# Patient Record
Sex: MALE | Race: WHITE | NOT HISPANIC OR LATINO | Employment: FULL TIME | URBAN - METROPOLITAN AREA
[De-identification: names, ages, dates, MRNs, and addresses within clinical notes are randomized per-mention and may not be internally consistent; named-entity substitution may affect disease eponyms.]

---
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
## 2020-01-26 ENCOUNTER — HOSPITAL ENCOUNTER (EMERGENCY)
Facility: HOSPITAL | Age: 20
Discharge: HOME | End: 2020-01-26
Attending: EMERGENCY MEDICINE | Admitting: EMERGENCY MEDICINE
Payer: COMMERCIAL

## 2020-01-26 ENCOUNTER — APPOINTMENT (EMERGENCY)
Dept: RADIOLOGY | Facility: HOSPITAL | Age: 20
End: 2020-01-26
Payer: COMMERCIAL

## 2020-01-26 VITALS
OXYGEN SATURATION: 97 % | BODY MASS INDEX: 30.15 KG/M2 | DIASTOLIC BLOOD PRESSURE: 79 MMHG | SYSTOLIC BLOOD PRESSURE: 148 MMHG | RESPIRATION RATE: 18 BRPM | TEMPERATURE: 98.2 F | HEART RATE: 90 BPM | WEIGHT: 234.79 LBS

## 2020-01-26 DIAGNOSIS — S42.391A OTHER FRACTURE OF SHAFT OF RIGHT HUMERUS, INITIAL ENCOUNTER FOR CLOSED FRACTURE: Primary | ICD-10-CM

## 2020-01-26 PROCEDURE — 73070 X-RAY EXAM OF ELBOW: CPT

## 2020-01-26 PROCEDURE — 99284 EMERGENCY DEPT VISIT MOD MDM: CPT | Performed by: NURSE PRACTITIONER

## 2020-01-26 PROCEDURE — 99283 EMERGENCY DEPT VISIT LOW MDM: CPT

## 2020-01-26 RX ORDER — HYDROCODONE BITARTRATE AND ACETAMINOPHEN 5; 325 MG/1; MG/1
1 TABLET ORAL EVERY 6 HOURS PRN
Qty: 20 TABLET | Refills: 0 | Status: SHIPPED | OUTPATIENT
Start: 2020-01-26

## 2020-01-26 RX ORDER — HYDROCODONE BITARTRATE AND ACETAMINOPHEN 5; 325 MG/1; MG/1
1 TABLET ORAL ONCE
Status: COMPLETED | OUTPATIENT
Start: 2020-01-26 | End: 2020-01-26

## 2020-01-26 RX ADMIN — HYDROCODONE BITARTRATE AND ACETAMINOPHEN 1 TABLET: 5; 325 TABLET ORAL at 20:17

## 2020-01-27 NOTE — ED PROVIDER NOTES
History  Chief Complaint   Patient presents with    Elbow Injury     fell while skiing, landed on elbow, c/o right elbow pain        Arm Injury - Major   Location:  Arm  Arm location:  R upper arm  Injury: yes    Time since incident:  1 hour  Mechanism of injury: fall    Fall:     Fall occurred:  Skiing/snowboarding    Impact surface:  [facility] of impact:  Outstretched arms  Pain details:     Quality:  Throbbing    Radiates to:  Does not radiate    Severity:  Moderate    Onset quality:  Sudden    Timing:  Constant  Handedness:  Right-handed  Dislocation: no    Prior injury to area:  No  Relieved by:  None tried  Worsened by: Movement  Ineffective treatments:  None tried  Associated symptoms: no back pain, no fatigue and no fever        Prior to Admission Medications   Prescriptions Last Dose Informant Patient Reported? Taking?   ibuprofen (MOTRIN) 600 mg tablet   No No   Sig: Take 1 tablet (600 mg total) by mouth every 6 (six) hours as needed for mild pain for up to 10 days   meclizine (ANTIVERT) 25 mg tablet   No No   Sig: Take 1 tablet by mouth 3 (three) times a day as needed for dizziness for up to 3 days      Facility-Administered Medications: None       Past Medical History:   Diagnosis Date    Asthma     last assessed 1/28/15    Bell's palsy     last assessed 8/18/17        History reviewed  No pertinent surgical history  Family History   Problem Relation Age of Onset    Bipolar disorder Father     Lactose intolerance Maternal Grandmother     Heart failure Family     Irritable bowel syndrome Family     Lactose intolerance Family     Food intolerance Family         gluten intolerance     Thyroid disease Family      I have reviewed and agree with the history as documented      Social History     Tobacco Use    Smoking status: Never Smoker    Smokeless tobacco: Never Used    Tobacco comment: never smoker per allscript    Substance Use Topics    Alcohol use: Never     Frequency: Never    Drug use: No        Review of Systems   Constitutional: Negative for diaphoresis, fatigue and fever  HENT: Negative for congestion, ear pain, nosebleeds and sore throat  Eyes: Negative for photophobia, pain, discharge and visual disturbance  Respiratory: Negative for cough, choking, chest tightness, shortness of breath and wheezing  Cardiovascular: Negative for chest pain and palpitations  Gastrointestinal: Negative for abdominal distention, abdominal pain, diarrhea and vomiting  Genitourinary: Negative for dysuria, flank pain and frequency  Musculoskeletal: Negative for back pain, gait problem and joint swelling  Skin: Negative for color change and rash  Neurological: Negative for dizziness, syncope and headaches  Psychiatric/Behavioral: Negative for behavioral problems and confusion  The patient is not nervous/anxious  All other systems reviewed and are negative  Physical Exam  Physical Exam   Constitutional: He is oriented to person, place, and time  He appears well-developed and well-nourished  No distress  HENT:   Head: Normocephalic and atraumatic  Eyes: Conjunctivae and EOM are normal  Right eye exhibits no discharge  Left eye exhibits no discharge  Neck: Normal range of motion  Neck supple  Cardiovascular: Normal rate  Pulmonary/Chest: Effort normal  No respiratory distress  Abdominal: He exhibits no distension  There is no guarding  Musculoskeletal: He exhibits no edema or deformity  Right elbow: He exhibits swelling and effusion  Right upper arm: He exhibits tenderness and swelling  Neurological: He is alert and oriented to person, place, and time  Coordination normal    Skin: Skin is warm and dry  Psychiatric: He has a normal mood and affect  Nursing note and vitals reviewed        Vital Signs  ED Triage Vitals [01/26/20 1938]   Temperature Pulse Respirations Blood Pressure SpO2   98 2 °F (36 8 °C) 90 18 148/79 97 %      Temp Source Heart Rate Source Patient Position - Orthostatic VS BP Location FiO2 (%)   Oral Monitor Sitting Left arm --      Pain Score       7           Vitals:    01/26/20 1938   BP: 148/79   Pulse: 90   Patient Position - Orthostatic VS: Sitting         Visual Acuity      ED Medications  Medications   HYDROcodone-acetaminophen (NORCO) 5-325 mg per tablet 1 tablet (1 tablet Oral Given 1/26/20 2017)       Diagnostic Studies  Results Reviewed     None                 XR elbow 2 vw right   ED Interpretation by TRISH Pal (01/26 2101)   Humerus fracture       by Matilda Toney (01/26 2031)                 Procedures  Splint application  Date/Time: 1/26/2020 9:08 PM  Performed by: TRISH Pal  Authorized by: TRISH Pal     Patient location:  ED  Performing Provider:  NP  Other Assisting Provider: No    Consent:     Consent obtained:  Verbal and emergent situation    Consent given by:  Patient and parent    Risks discussed:  Discoloration, numbness, pain and swelling    Alternatives discussed:  No treatment and delayed treatment  Universal protocol:     Procedure explained and questions answered to patient or proxy's satisfaction: yes      Site/side marked: yes      Patient identity confirmed:  Verbally with patient and arm band  Indication:     Indications: fracture    Pre-procedure details:     Sensation:  Normal  Procedure details:     Splint type: Coaptation splint  Supplies:  Cotton padding, elastic bandage and Ortho-Glass  Post-procedure details:     Pain:  Improved    Sensation:  Normal    Neurovascular Exam: skin pink, capillary refill <2 sec, normal pulses and skin intact, warm, and dry      Patient tolerance of procedure:   Tolerated well, no immediate complications             ED Course                               MDM  Number of Diagnoses or Management Options  Other fracture of shaft of right humerus, initial encounter for closed fracture: new and requires workup     Amount and/or Complexity of Data Reviewed  Tests in the radiology section of CPT®: reviewed and ordered  Independent visualization of images, tracings, or specimens: yes    Patient Progress  Patient progress: improved        Disposition  Final diagnoses:   Other fracture of shaft of right humerus, initial encounter for closed fracture     Time reflects when diagnosis was documented in both MDM as applicable and the Disposition within this note     Time User Action Codes Description Comment    1/26/2020  9:05 PM John Kern Other fracture of shaft of right humerus, initial encounter for closed fracture       ED Disposition     ED Disposition Condition Date/Time Comment    Discharge Stable Sun Jan 26, 2020  9:03 PM Lee Lisa discharge to home/self care  Follow-up Information    None         Patient's Medications   Discharge Prescriptions    HYDROCODONE-ACETAMINOPHEN (NORCO) 5-325 MG PER TABLET    Take 1 tablet by mouth every 6 (six) hours as needed (Not relieved by Anti-inflammatory) for up to 20 dosesMax Daily Amount: 4 tablets       Start Date: 1/26/2020 End Date: --       Order Dose: 1 tablet       Quantity: 20 tablet    Refills: 0     No discharge procedures on file      ED Provider  Electronically Signed by           TRISH Cedeno  01/26/20 2024

## 2021-11-10 NOTE — PROGRESS NOTES
Assessment/Plan:       Diagnoses and all orders for this visit:    Exudative pharyngitis        1  Stay hydrated while feeling ill  2  Use NSAID for symptom relief  3  Follow up if condition changes or worsens  Subjective:      Patient ID: Jr Peoples is a 24 y o  male  53-year-old male presents with viral like symptoms since yesterday  Had a sore throat with a little bit of itchiness  Dull achy last night  Reports when he swallows of feels like swallowing glass  Feel sick  Took some over-the-counter  No help  The following portions of the patient's history were reviewed and updated as appropriate: allergies and current medications  Review of Systems   Constitutional: Positive for activity change  HENT: Positive for sore throat  Respiratory: Negative  Cardiovascular: Negative  Objective:  /68 (BP Location: Right arm, Patient Position: Sitting)   Pulse 99   Temp 99 °F (37 2 °C)   Resp 18   Ht 6' 2" (1 88 m)   Wt 109 kg (241 lb)   SpO2 97%   BMI 30 94 kg/m²      Physical Exam   Constitutional: He appears well-developed and well-nourished  HENT:   Head: Normocephalic and atraumatic  Right Ear: External ear normal    Left Ear: External ear normal    Nose: Nose normal    Mouth/Throat: Oropharyngeal exudate present  Neck: Normal range of motion  Neck supple  Cardiovascular: Normal rate, regular rhythm and normal heart sounds      Pulmonary/Chest: Effort normal and breath sounds normal 
Alert and oriented, no focal deficits, no motor or sensory deficits.